# Patient Record
Sex: MALE | Race: BLACK OR AFRICAN AMERICAN | Employment: UNEMPLOYED | URBAN - METROPOLITAN AREA
[De-identification: names, ages, dates, MRNs, and addresses within clinical notes are randomized per-mention and may not be internally consistent; named-entity substitution may affect disease eponyms.]

---

## 2019-01-09 ENCOUNTER — HOSPITAL ENCOUNTER (INPATIENT)
Age: 29
LOS: 4 days | Discharge: HOME | DRG: 885 | End: 2019-01-14
Attending: EMERGENCY MEDICINE | Admitting: PSYCHIATRY & NEUROLOGY
Payer: COMMERCIAL

## 2019-01-09 DIAGNOSIS — F32.A DEPRESSION, UNSPECIFIED DEPRESSION TYPE: ICD-10-CM

## 2019-01-09 DIAGNOSIS — T39.1X2A SUICIDE ATTEMPT BY ACETAMINOPHEN OVERDOSE, INITIAL ENCOUNTER (HCC): Primary | ICD-10-CM

## 2019-01-09 LAB
ALBUMIN SERPL-MCNC: 4.4 G/DL (ref 3.4–5)
ALBUMIN/GLOB SERPL: 1.3 {RATIO} (ref 0.8–1.7)
ALP SERPL-CCNC: 47 U/L (ref 45–117)
ALT SERPL-CCNC: 50 U/L (ref 16–61)
AMPHET UR QL SCN: NEGATIVE
ANION GAP SERPL CALC-SCNC: 6 MMOL/L (ref 3–18)
APAP SERPL-MCNC: 11 UG/ML (ref 10–30)
APAP SERPL-MCNC: 3 UG/ML (ref 10–30)
AST SERPL-CCNC: 55 U/L (ref 15–37)
BARBITURATES UR QL SCN: NEGATIVE
BASOPHILS # BLD: 0 K/UL (ref 0–0.1)
BASOPHILS NFR BLD: 1 % (ref 0–2)
BENZODIAZ UR QL: NEGATIVE
BILIRUB SERPL-MCNC: 0.4 MG/DL (ref 0.2–1)
BUN SERPL-MCNC: 16 MG/DL (ref 7–18)
BUN/CREAT SERPL: 14 (ref 12–20)
CALCIUM SERPL-MCNC: 8.6 MG/DL (ref 8.5–10.1)
CANNABINOIDS UR QL SCN: NEGATIVE
CHLORIDE SERPL-SCNC: 106 MMOL/L (ref 100–108)
CO2 SERPL-SCNC: 28 MMOL/L (ref 21–32)
COCAINE UR QL SCN: NEGATIVE
CREAT SERPL-MCNC: 1.14 MG/DL (ref 0.6–1.3)
DIFFERENTIAL METHOD BLD: ABNORMAL
EOSINOPHIL # BLD: 0 K/UL (ref 0–0.4)
EOSINOPHIL NFR BLD: 0 % (ref 0–5)
ERYTHROCYTE [DISTWIDTH] IN BLOOD BY AUTOMATED COUNT: 12.5 % (ref 11.6–14.5)
ETHANOL SERPL-MCNC: <3 MG/DL (ref 0–3)
GLOBULIN SER CALC-MCNC: 3.3 G/DL (ref 2–4)
GLUCOSE SERPL-MCNC: 94 MG/DL (ref 74–99)
HCT VFR BLD AUTO: 46.5 % (ref 36–48)
HDSCOM,HDSCOM: NORMAL
HGB BLD-MCNC: 16.5 G/DL (ref 13–16)
LYMPHOCYTES # BLD: 1.5 K/UL (ref 0.9–3.6)
LYMPHOCYTES NFR BLD: 49 % (ref 21–52)
MAGNESIUM SERPL-MCNC: 2.1 MG/DL (ref 1.6–2.6)
MCH RBC QN AUTO: 29.5 PG (ref 24–34)
MCHC RBC AUTO-ENTMCNC: 35.5 G/DL (ref 31–37)
MCV RBC AUTO: 83.2 FL (ref 74–97)
METHADONE UR QL: NEGATIVE
MONOCYTES # BLD: 0.2 K/UL (ref 0.05–1.2)
MONOCYTES NFR BLD: 5 % (ref 3–10)
NEUTS SEG # BLD: 1.4 K/UL (ref 1.8–8)
NEUTS SEG NFR BLD: 45 % (ref 40–73)
OPIATES UR QL: NEGATIVE
PCP UR QL: NEGATIVE
PLATELET # BLD AUTO: 217 K/UL (ref 135–420)
PMV BLD AUTO: 9.1 FL (ref 9.2–11.8)
POTASSIUM SERPL-SCNC: 3.6 MMOL/L (ref 3.5–5.5)
PROT SERPL-MCNC: 7.7 G/DL (ref 6.4–8.2)
RBC # BLD AUTO: 5.59 M/UL (ref 4.7–5.5)
SALICYLATES SERPL-MCNC: <2.8 MG/DL (ref 2.8–20)
SODIUM SERPL-SCNC: 140 MMOL/L (ref 136–145)
WBC # BLD AUTO: 3.1 K/UL (ref 4.6–13.2)

## 2019-01-09 PROCEDURE — 80053 COMPREHEN METABOLIC PANEL: CPT

## 2019-01-09 PROCEDURE — 85025 COMPLETE CBC W/AUTO DIFF WBC: CPT

## 2019-01-09 PROCEDURE — 99285 EMERGENCY DEPT VISIT HI MDM: CPT

## 2019-01-09 PROCEDURE — 74011250636 HC RX REV CODE- 250/636: Performed by: EMERGENCY MEDICINE

## 2019-01-09 PROCEDURE — 74011000250 HC RX REV CODE- 250: Performed by: EMERGENCY MEDICINE

## 2019-01-09 PROCEDURE — 83735 ASSAY OF MAGNESIUM: CPT

## 2019-01-09 PROCEDURE — 93005 ELECTROCARDIOGRAM TRACING: CPT

## 2019-01-09 PROCEDURE — 96361 HYDRATE IV INFUSION ADD-ON: CPT

## 2019-01-09 PROCEDURE — 96374 THER/PROPH/DIAG INJ IV PUSH: CPT

## 2019-01-09 PROCEDURE — 80307 DRUG TEST PRSMV CHEM ANLYZR: CPT

## 2019-01-09 RX ORDER — ONDANSETRON 2 MG/ML
4 INJECTION INTRAMUSCULAR; INTRAVENOUS
Status: COMPLETED | OUTPATIENT
Start: 2019-01-09 | End: 2019-01-09

## 2019-01-09 RX ADMIN — SODIUM CHLORIDE 1000 ML: 900 INJECTION, SOLUTION INTRAVENOUS at 17:05

## 2019-01-09 RX ADMIN — POISON ADSORBENT 50 G: 50 SUSPENSION ORAL at 16:41

## 2019-01-09 RX ADMIN — ONDANSETRON 4 MG: 2 INJECTION INTRAMUSCULAR; INTRAVENOUS at 17:03

## 2019-01-09 NOTE — ED PROVIDER NOTES
EMERGENCY DEPARTMENT HISTORY AND PHYSICAL EXAM 
 
4:35 PM 
 
 
Date: 1/9/2019 Patient Name: Chiki Clifton History of Presenting Illness Chief Complaint Patient presents with  Drug Overdose  Mental Health Problem History Provided By: Patient Chief Complaint: Drug overdose Duration: 15 Minutes Timing:  Acute Location: NA 
Quality: 5 500mg Tylenol Severity: Moderate Modifying Factors: NA Associated Symptoms: suicidal ideations Additional History (Context): Chiki Clifton is a 34 y.o. male with no PMHx who presents with c/o moderate acute onset drug overdose of 5 500 mg Tylenol that occurred 15 minutes ago with associated Sx of suicidal ideations. He states he took all of the pills at once with water and he was at home when he did this. Pt states he has been under a lot of stress recently. Pt denies ETOH or drug use. Denies any further complaints or symptoms at the moment. PCP: None Current Outpatient Medications Medication Sig Dispense Refill  tolterodine ER (DETROL LA) 4 mg ER capsule Take 1 Cap by mouth daily. 90 Cap 3 Past History Past Medical History: 
History reviewed. No pertinent past medical history. Past Surgical History: 
History reviewed. No pertinent surgical history. Family History: 
History reviewed. No pertinent family history. Social History: 
Social History Tobacco Use  Smoking status: Never Smoker  Smokeless tobacco: Never Used Substance Use Topics  Alcohol use: Yes  Drug use: Not on file Allergies: Allergies Allergen Reactions  Penicillins Other (comments) Pt cant remember what reaction was to PCN . Review of Systems Review of Systems Constitutional: Negative for activity change, fatigue and fever. HENT: Negative for congestion and rhinorrhea. Eyes: Negative for visual disturbance. Respiratory: Negative for shortness of breath. Cardiovascular: Negative for chest pain and palpitations. Gastrointestinal: Negative for abdominal pain, diarrhea, nausea and vomiting. Genitourinary: Negative for dysuria and hematuria. Musculoskeletal: Negative for back pain. Skin: Negative for rash. Neurological: Negative for dizziness, weakness and light-headedness. Psychiatric/Behavioral: Positive for suicidal ideas. Positive for drug overdose All other systems reviewed and are negative. Physical Exam  
 
Visit Vitals /81 Pulse (!) 104 Temp 98.2 °F (36.8 °C) Resp 15 SpO2 99% Physical Exam  
Constitutional: Vital signs are normal. He appears well-developed and well-nourished. He is active. Non-toxic appearance. He does not appear ill. No distress. HENT:  
Head: Normocephalic and atraumatic. Neck: Normal range of motion. Neck supple. Carotid bruit is not present. No tracheal deviation present. No thyromegaly present. Cardiovascular: Regular rhythm and normal heart sounds. Exam reveals no gallop and no friction rub. No murmur heard. Tachycardiac rate Pulmonary/Chest: Effort normal and breath sounds normal. No stridor. No respiratory distress. He has no wheezes. He has no rales. He exhibits no tenderness. Abdominal: Soft. He exhibits no distension and no mass. There is no tenderness. There is no rebound, no guarding and no CVA tenderness. Musculoskeletal: Normal range of motion. Neurological: He is alert. Skin: Skin is warm, dry and intact. He is not diaphoretic. No pallor. Psychiatric: He has a normal mood and affect. His speech is normal and behavior is normal. Judgment and thought content normal.  
Nursing note and vitals reviewed. Diagnostic Study Results Labs - Recent Results (from the past 12 hour(s)) DRUG SCREEN, URINE Collection Time: 01/09/19  4:34 PM  
Result Value Ref Range  BENZODIAZEPINES NEGATIVE  NEG    
 BARBITURATES NEGATIVE  NEG    
 THC (TH-CANNABINOL) NEGATIVE  NEG    
 OPIATES NEGATIVE  NEG    
 PCP(PHENCYCLIDINE) NEGATIVE  NEG    
 COCAINE NEGATIVE  NEG    
 AMPHETAMINES NEGATIVE  NEG METHADONE NEGATIVE  NEG HDSCOM (NOTE) CBC WITH AUTOMATED DIFF Collection Time: 01/09/19  4:55 PM  
Result Value Ref Range WBC 3.1 (L) 4.6 - 13.2 K/uL  
 RBC 5.59 (H) 4.70 - 5.50 M/uL  
 HGB 16.5 (H) 13.0 - 16.0 g/dL HCT 46.5 36.0 - 48.0 % MCV 83.2 74.0 - 97.0 FL  
 MCH 29.5 24.0 - 34.0 PG  
 MCHC 35.5 31.0 - 37.0 g/dL  
 RDW 12.5 11.6 - 14.5 % PLATELET 518 507 - 095 K/uL MPV 9.1 (L) 9.2 - 11.8 FL  
 NEUTROPHILS 45 40 - 73 % LYMPHOCYTES 49 21 - 52 % MONOCYTES 5 3 - 10 % EOSINOPHILS 0 0 - 5 % BASOPHILS 1 0 - 2 %  
 ABS. NEUTROPHILS 1.4 (L) 1.8 - 8.0 K/UL  
 ABS. LYMPHOCYTES 1.5 0.9 - 3.6 K/UL  
 ABS. MONOCYTES 0.2 0.05 - 1.2 K/UL  
 ABS. EOSINOPHILS 0.0 0.0 - 0.4 K/UL  
 ABS. BASOPHILS 0.0 0.0 - 0.1 K/UL  
 DF AUTOMATED METABOLIC PANEL, COMPREHENSIVE Collection Time: 01/09/19  4:55 PM  
Result Value Ref Range Sodium 140 136 - 145 mmol/L Potassium 3.6 3.5 - 5.5 mmol/L Chloride 106 100 - 108 mmol/L  
 CO2 28 21 - 32 mmol/L Anion gap 6 3.0 - 18 mmol/L Glucose 94 74 - 99 mg/dL BUN 16 7.0 - 18 MG/DL Creatinine 1.14 0.6 - 1.3 MG/DL  
 BUN/Creatinine ratio 14 12 - 20 GFR est AA >60 >60 ml/min/1.73m2 GFR est non-AA >60 >60 ml/min/1.73m2 Calcium 8.6 8.5 - 10.1 MG/DL Bilirubin, total 0.4 0.2 - 1.0 MG/DL  
 ALT (SGPT) 50 16 - 61 U/L  
 AST (SGOT) 55 (H) 15 - 37 U/L Alk. phosphatase 47 45 - 117 U/L Protein, total 7.7 6.4 - 8.2 g/dL Albumin 4.4 3.4 - 5.0 g/dL Globulin 3.3 2.0 - 4.0 g/dL A-G Ratio 1.3 0.8 - 1.7 SALICYLATE Collection Time: 01/09/19  4:55 PM  
Result Value Ref Range Salicylate level <0.8 (L) 2.8 - 20.0 MG/DL  
ETHYL ALCOHOL Collection Time: 01/09/19  4:55 PM  
Result Value Ref Range  ALCOHOL(ETHYL),SERUM <3 0 - 3 MG/DL  
ACETAMINOPHEN  
 Collection Time: 01/09/19  4:55 PM  
Result Value Ref Range Acetaminophen level 11 10.0 - 30.0 ug/mL EKG, 12 LEAD, INITIAL Collection Time: 01/09/19  5:06 PM  
Result Value Ref Range Ventricular Rate 111 BPM  
 Atrial Rate 111 BPM  
 P-R Interval 138 ms QRS Duration 86 ms  
 Q-T Interval 378 ms QTC Calculation (Bezet) 514 ms Calculated P Axis 48 degrees Calculated R Axis 7 degrees Calculated T Axis 59 degrees Diagnosis Sinus tachycardia Otherwise normal ECG No previous ECGs available EKG, 12 LEAD, SUBSEQUENT Collection Time: 01/09/19  8:46 PM  
Result Value Ref Range Ventricular Rate 112 BPM  
 Atrial Rate 112 BPM  
 P-R Interval 132 ms QRS Duration 82 ms Q-T Interval 322 ms QTC Calculation (Bezet) 439 ms Calculated P Axis 49 degrees Calculated R Axis -4 degrees Calculated T Axis 37 degrees Diagnosis Sinus tachycardia Otherwise normal ECG When compared with ECG of 09-JAN-2019 17:06, No significant change was found ACETAMINOPHEN Collection Time: 01/09/19  8:50 PM  
Result Value Ref Range Acetaminophen level 3 (L) 10.0 - 30.0 ug/mL MAGNESIUM Collection Time: 01/09/19  8:50 PM  
Result Value Ref Range Magnesium 2.1 1.6 - 2.6 mg/dL Radiologic Studies - No orders to display Medical Decision Making I am the first provider for this patient. I reviewed the vital signs, available nursing notes, past medical history, past surgical history, family history and social history. Vital Signs-Reviewed the patient's vital signs. Pulse Oximetry Analysis -  100% on room air (Interpretation) normal  
 
Records Reviewed: Nursing Notes and Old Medical Records (Time of Review: 4:35 PM) ED Course: Progress Notes, Reevaluation, and Consults: 
Spoke with poison control upon pt's arrival.  Recommendation for activated charcoal. 
 
Provider Notes (Medical Decision Making): get labs, EKG, give charcoal. Repeat tylenol and once medically cleared, consult crisis. 5:55 PM : Pt care transferred to [facility] provider. History of patient complaint(s), available diagnostic reports and current treatment plan has been discussed thoroughly. Bedside rounding on patient occured : no . Intended disposition of patient : admit Pending diagnostics reports and/or labs (please list): medical clearance, crisis evaluation Diagnosis Clinical Impression: 1. Suicide attempt by acetaminophen overdose, initial encounter ([facility] [address] 2. Depression, unspecified depression type Disposition: TBD Follow-up Information None Medication List  
  
ASK your doctor about these medications   
tolterodine ER 4 mg ER capsule Commonly known as:  Marcellustyson Boss Take 1 Cap by mouth daily. _______________________________ Scribe Attestation Rajwinder Cuevas acting as a scribe for and in the presence of Dante Jennings January 09, 2019 at 4:35 PM 
    
Provider Attestation:     
I personally performed the services described in the documentation, reviewed the documentation, as recorded by the scribe in my presence, and it accurately and completely records my words and actions. January 09, 2019 at 4:35 PM - Lane Jeffries PA-C 
 
 
_______________________________ Progress note: 
 
 
4 hour tylenol level is 3,  Pt is medically cleared for mh evaluation. [address] Cover with crisis informed of clearance for evaluation 0400 pt still awaiting crisis disposition, pt is resting quietly and stable at this time 4:22 AM : Pt care transferred to Dr. Meme Lozoya  ,ED provider. History of patient complaint(s), available diagnostic reports and current treatment plan has been discussed thoroughly. Bedside rounding on patient occured : no . Intended disposition of patient : TBD Pending diagnostics reports and/or labs (please list):  Crisis eval 
 
 
 
 
Rhae Du ENP-C, FNP-C

## 2019-01-09 NOTE — ED TRIAGE NOTES
Pt states took at total of five 500 mg tylenol at approx 1615. Pt admits thoughts of SI and taking medication states under a lot of stress.

## 2019-01-10 PROBLEM — R45.89 SUICIDAL BEHAVIOR: Status: ACTIVE | Noted: 2019-01-10

## 2019-01-10 PROBLEM — F32.A DEPRESSION: Status: ACTIVE | Noted: 2019-01-10

## 2019-01-10 PROBLEM — F32.2 MAJOR DEPRESSIVE DISORDER, SINGLE EPISODE, SEVERE WITHOUT PSYCHOTIC FEATURES (HCC): Status: ACTIVE | Noted: 2019-01-10

## 2019-01-10 LAB
ATRIAL RATE: 111 BPM
ATRIAL RATE: 112 BPM
CALCULATED P AXIS, ECG09: 48 DEGREES
CALCULATED P AXIS, ECG09: 49 DEGREES
CALCULATED R AXIS, ECG10: -4 DEGREES
CALCULATED R AXIS, ECG10: 7 DEGREES
CALCULATED T AXIS, ECG11: 37 DEGREES
CALCULATED T AXIS, ECG11: 59 DEGREES
DIAGNOSIS, 93000: NORMAL
DIAGNOSIS, 93000: NORMAL
P-R INTERVAL, ECG05: 132 MS
P-R INTERVAL, ECG05: 138 MS
Q-T INTERVAL, ECG07: 322 MS
Q-T INTERVAL, ECG07: 378 MS
QRS DURATION, ECG06: 82 MS
QRS DURATION, ECG06: 86 MS
QTC CALCULATION (BEZET), ECG08: 439 MS
QTC CALCULATION (BEZET), ECG08: 514 MS
VENTRICULAR RATE, ECG03: 111 BPM
VENTRICULAR RATE, ECG03: 112 BPM

## 2019-01-10 PROCEDURE — 74011250637 HC RX REV CODE- 250/637: Performed by: PSYCHIATRY & NEUROLOGY

## 2019-01-10 PROCEDURE — 65220000003 HC RM SEMIPRIVATE PSYCH

## 2019-01-10 RX ORDER — TRAZODONE HYDROCHLORIDE 50 MG/1
50 TABLET ORAL
Status: DISCONTINUED | OUTPATIENT
Start: 2019-01-10 | End: 2019-01-14 | Stop reason: HOSPADM

## 2019-01-10 RX ORDER — HYDROXYZINE PAMOATE 50 MG/1
50 CAPSULE ORAL
Status: DISCONTINUED | OUTPATIENT
Start: 2019-01-10 | End: 2019-01-10

## 2019-01-10 RX ORDER — HYDROXYZINE PAMOATE 50 MG/1
50 CAPSULE ORAL
Status: DISCONTINUED | OUTPATIENT
Start: 2019-01-10 | End: 2019-01-14 | Stop reason: HOSPADM

## 2019-01-10 RX ADMIN — TRAZODONE HYDROCHLORIDE 50 MG: 50 TABLET ORAL at 20:45
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
## 2019-01-10 NOTE — BSMART NOTE
SOCIAL WORK GROUP THERAPY PROGRESS NOTE Group Time:  11am 
 
Group Topic:  Coping Skills Group Participation: Pt was not disturbed due to staff discretion

## 2019-01-10 NOTE — ED NOTES
TRANSFER - OUT REPORT: 
 
Verbal report given to Donovan Vasquez RN(name) on Vasquez Pierce  being transferred to adult unit, behavioral health(unit) for routine progression of care Report consisted of patients Situation, Background, Assessment and  
Recommendations(SBAR). Information from the following report(s) SBAR was reviewed with the receiving nurse. Lines:  
Peripheral IV 01/09/19 Left Antecubital (Active) Site Assessment Clean, dry, & intact 1/9/2019  4:55 PM  
Phlebitis Assessment 0 1/9/2019  4:55 PM  
Infiltration Assessment 0 1/9/2019  4:55 PM  
Dressing Status Clean, dry, & intact 1/9/2019  4:55 PM  
Dressing Type Tape;Transparent 1/9/2019  4:55 PM  
Hub Color/Line Status Pink;Patent; Flushed 1/9/2019  4:55 PM  
Action Taken Blood drawn 1/9/2019  4:55 PM  
  
 
Opportunity for questions and clarification was provided. Patient transported with: 
 Client Outlook

## 2019-01-10 NOTE — H&P
[facility] ADMIT NOTE Sidney Chirinos 
MR#: [id number] : 1990 ACCOUNT #: [de-identified] ADMIT DATE: 2019 IDENTIFYING INFORMATION:  The patient is a 82-year-old [place] American male, single, admitted to this facility on a voluntary basis on the above-mentioned date. BASIS FOR ADMISSION:  The patient brought himself to the attention of [facility] Emergency Department with a history then obtained indicative of his taking an overdose of acetaminophen a total of five 500 mg tablets that occurred 15 minutes prior to his coming to the ED. Treated with charcoal and considered to be medically cleared. After a while the patient was evaluated by one of our crisis workers at which time it became obvious that he required further inpatient psychiatric care. The patient's case was presented before the psychiatrist on call who kindly admitted the patient to my service for this admission. PSYCHIATRIC HISTORY:  This is the first time in which the patient is psychiatrically treated. The main issue appears to be a stress associated to his job, the fact that \"he never knew when he was going to have to leave town,\" in addition to having some stress related discussions with his aunt and uncle with whom he resides regarding what he wants to do in the future. 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 That is the reason for which he has been leaving town for up to a week or 10 days, coming back home for 1 or 2 days and then he leaves town again. This has not helped with his personal life, he says, but in general did not help either with his being able to determine what he wants to do with his life.   The patient was able to put enough money away for him to not to have to work up to a year he says and so when he discussed about this with his aunt and suggested that he would like to do some type study that will provide him with a job in the medical field. It appears that the type of job that he told his aunt that he wanted to look into was not going to [place] able to provide him with a decent income. \"  So upset about his family's reaction, the patient impulsively, he says, took the above-mentioned overdose. After being medically cleared inpatient hospitalization was then indicated and so this is the basis for this admission. PAST MEDICAL HISTORY:  Patient has what appears to be negative medical history. He is a young man with a negative history of smoking, drug use, negative surgical history also. REVIEW OF SYSTEMS:   
CONSTITUTIONAL:  (Obtained from the workup in the emergency room) HEENT:  Negative. EYES:  Negative. RESPIRATORY:  Negative. CARDIOVASCULAR:  Negative. GASTROINTESTINAL:  Negative. GENITOURINARY:  Negative. MUSCULOSKELETAL:  Negative. NEUROLOGICAL:  Negative. PSYCHIATRIC:  Please see above-mentioned history. PHYSICAL EXAMINATION: 
VITAL SIGNS:  Blood pressure in the emergency room 146/81 with a heart rate of 104. Since admission to the facility, blood pressure today 149/86 with a heart rate of 85. Respirations 16. Temperature 99 degrees Fahrenheit. When he was being evaluated in the emergency room, it was noted that his blood pressure was elevated to 186/106 with a heart rate of 118. It is not clear as to that being a mistake or not. However, it appears that it is the former. The patient has never been treated for hypertension before and indicated that the blood pressure is not \"his usual.\" 
CONSTITUTIONAL:  The patient is showing no evidence of alcohol or any other type of drugs, other signs of intoxication or withdrawal symptoms. He is described as well-developed, well-nourished, nontoxic appearance. HEENT:  Normocephalic and atraumatic. NECK:  Normal range of motion. Neck supple.   Carotid bruit is not present. There is no tracheal deviation. There is no evidence or thyromegaly. CARDIOVASCULAR:  Regular rhythm, normal heart sounds. There is no gallop or friction rub. There is no evidence of murmurs. The patient's heart rate was described as high. PULMONARY:  Effort normal.  Breath sounds are normal.  There is no respiratory distress. There is no wheezing, no tenderness. ABDOMEN:  Soft. There is no evidence of distention, no rebound. There are no masses. There is no visceromegaly. MUSCULOSKELETAL:  Normal range of motion. NEUROLOGIC:  Alert. Cranial nerves are normal.  Reflexes are 2+ equally bilaterally. PSYCHIATRIC:  Please see mental status exam. 
 
LABORATORY DATA:  Multiple lab tests were performed at the time of the patient's evaluation in the emergency room including a CBC with differential that showed the presence of mild leukopenia with a white blood cell count of 3.1; however, elevated red blood cell to 5.59, hemoglobin 16 was noted. Hematocrit is normal at 46.5. Differential is normal.  Platelet count normal at 217. Blood chemistry showed normal electrolytes:  Sodium 140, potassium 3.6, chloride 106, blood sugar 94, BUN of 16, creatinine 1.14. Estimated GFR above 60 mL per minute. Liver function test is normal with the exception of mild elevation of AST to 55, normal values between 15 and 37. Acetaminophen levels 11 when he came in, several hours later (5 hours later) 3. Salicylate levels below therapeutic range. Urine drug screen was negative. Electrocardiogram showed presence of a sinus tachycardia with a ventricular rate response of 112 per minute,  and QTC of 439 noted. SOCIAL HISTORY:  The patient denies the use of alcohol or illicit substances and no use of over-the-counter medications. PERSONAL AND FAMILY HISTORY:  The patient has 2 older brothers. He is the third of 3, having.   He indicated that after he was born, his mother took him to his uncle and aunt's home to stay there and that is the place where he grew up entirely. It appeared that \"his mother did not have enough income to maintain the 3 children,\" and so that being the reason for which he basically was under the custody of his mother's family. The patient is a high school grad. 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 His job was enjoyable  initially, he said since he traveled all over the place. However, later on became a very tiresome type of job, especially because of never being able to be at home for more than a couple of days. Also the number of hours that he worked were very, very high. The patient is not involved in any relationships. He basically described the relationship with his uncle and aunt as family members who are supportive of him. However, exception to the rule is their reaction to what he said that he would like to go in the near future. It appears that the conversation did not go very well when he mentioned to them that he had resigned from his job. MENTAL STATUS EXAM:  -American male who looks his stated age. During this evaluation, the patient showed no evidence of alcohol or any other type of drug related signs, intoxication or withdrawal symptoms. The patient is coherent, shows quality of continuity of associations. There is no evidence of flight of ideas, pressure of his speech, ideas of reference or influence or any hallucinatory process. The patient is depressed. He described helplessness and hopelessness. However, he has been found able, willing and capable to talk to the staff. He has been able to control any thoughts of self-harm. There is no evidence, whatsoever of his being potentially dangerous to others. Cognition is intact. Intellectual capacity is average. The patient is considered to be psychiatrically competent. ASSETS:  Alert, intelligent, voluntary admission. WEAKNESSES:  Family stressors, job-related stressors. CLINICAL IMPRESSION:   
AXIS I:  Major depressive disorder, single, without psychosis, severe. AXIS II:  Noncontributory. AXIS III:  Status post acetaminophen overdose without evidence of sequela. PLAN: 
1. The patient was admitted to the adult CD program, will be seen daily and will be referred to the groups within context of the program. 
2.  Place on close watch for suicide. The patient is willing, able and capable to talk to staff. He has been able to control any thoughts of self-harm. We will obviously observe closely regarding this. 3.  The patient was advised about potential for prescriptions for antidepressants based upon his history. I suggested for him to consider the possibility of treatment with citalopram.  I will be asking the staff to give him a read out about Celexa and when I see him tomorrow, we will make a decision about prescribing or not, depending upon what he tells me. For now a prescription for Vistaril on a p.r.n. basis has been ordered. Estimated length of stay otherwise, he is 3-5 days. Prognosis will depend upon treatment response and treatment compliance. MD DARIO Garsia/ 
D: 01/10/2019 11:32    
T: 01/10/2019 12:31 
JOB #: [id number]
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
## 2019-01-10 NOTE — ED NOTES
Received nursing report from Sherwin angulo [employer]. Patient connected to cardiac monitor, tolerating food. Patient denies any complaints at this time. Vital signs are stable. Awaiting repeat lab results. Will continue to closely monitor patient. No acute distress noted.

## 2019-01-10 NOTE — ED NOTES
Spoke with [place] from poison control, updated on lab results and pt's condition. She recommended magnesium level and subsequent EKG. Orders placed, will continue to monitor.

## 2019-01-10 NOTE — PROGRESS NOTES
Patient has been in his room most of the day. He did come out for meals. Information provide on Celexa. Voiced no further complaints.

## 2019-01-10 NOTE — ED NOTES
Bedside shift change report given to [facility]. by Belem Chopra RN. Report included the following information ED Summary.

## 2019-01-10 NOTE — BH NOTES
Amando Weston is  participating in Treatment Concerns Group time: 1700 Personal goal for participation: [employer] Goal orientation: community Group therapy participation: fully participated Therapeutic interventions reviewed and discussed: Staff discussed  Staff discussed the Mental Health programs offered. Unit schedule for groups,  Visiting hours, patient advocate name and phone number and where this information is posted on the unit, etc. Report any maintenance/housekeeping or treatment concerns to staff so it can be addressed by the Treatment Team. 
 
Impression of participation: Pt.did not have any maintenance/housekeeping or treatment concerns to report to staff .

## 2019-01-10 NOTE — ED NOTES
Poison control updated on patient's care. Patient is medically cleared, awaiting evaluation from crisis nurse.

## 2019-01-10 NOTE — BH NOTES
Admission Note : SBAR received from Jose Antonio Pierce RN. Pt arrived via wheelchair accompanied by staff and security. 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 He states he put money aside to cover him for 1 year. He lives at home with his mother , aunt , and uncle. He is vague regarding his personal situational stressors but states he overdosed when feeling overwhelmed. He denies present suicidal ideation. He denies drug or alcohol abuse. He states he does not have a PCP.

## 2019-01-10 NOTE — BSMART NOTE
OCCUPATIONAL THERAPY PROGRESS NOTE Group Time:  1500 Attendance: The patient attended full group. Participation: The patient participated with minimal elaboration in the activity. Attention: The patient was able to focus on the activity. Interaction: The patient acknowledges others or responds to questions,  with no spontaneous interaction. Sat quietly most of group and responded minimally to direct questions only.

## 2019-01-11 PROCEDURE — 65220000003 HC RM SEMIPRIVATE PSYCH

## 2019-01-11 PROCEDURE — 74011250637 HC RX REV CODE- 250/637: Performed by: PSYCHIATRY & NEUROLOGY

## 2019-01-11 RX ORDER — CITALOPRAM 20 MG/1
20 TABLET, FILM COATED ORAL
Status: DISCONTINUED | OUTPATIENT
Start: 2019-01-11 | End: 2019-01-14 | Stop reason: HOSPADM

## 2019-01-11 RX ADMIN — CITALOPRAM HYDROBROMIDE 20 MG: 20 TABLET ORAL at 12:00

## 2019-01-11 RX ADMIN — TRAZODONE HYDROCHLORIDE 50 MG: 50 TABLET ORAL at 20:19

## 2019-01-11 NOTE — BH NOTES
Chiki Clifton is participating in Leisure Activity Group. Group time: 3803 Personal goal for participation:  Decrease Anxiety Goal orientation: social 
 
Group therapy participation: active Therapeutic interventions reviewed and discussed:  Staff encouraged to  choos relaxation activities to decrease anxiety while interacting with peers Impression of participation:  Pt. chose to , play games with peers, color dylan patterns or watch a movie.

## 2019-01-11 NOTE — BSMART NOTE
OCCUPATIONAL THERAPY PROGRESS NOTE Group Time:  0895 Attendance: The patient attended full group. Participation: The patient participated with moderate elaboration in the activity. Attention: The patient was able to focus on the activity. Interaction: The patient occasionally  interacts with others. Participated in identification of positive self talk and affirmation. Mood seemed slightly brighter, interacting more with peers.

## 2019-01-11 NOTE — BH NOTES
GROUP THERAPY PROGRESS NOTE Lila Blanco is participating in [place]. Group time: 30 minutes Personal goal for participation: verify insight and reality orientation; discuss guideline compliance, unit issues and community announcements Goal orientation: personal 
 
Group therapy participation: minimal

## 2019-01-11 NOTE — BSMART NOTE
SW made contact with patient as he engaged in art activity within the milieu. Patient is attentive and focused on purpose of interview. Patient reports to be feeling much better however, understands he may have been suffering with depression. It is reported that patient has attended groups and has been compliant with rules of the milieu. Patient denies SI/HI. Patient denies AVH. SW will continue to monitor patient during hospitalization consulting with treating psychiatrist to complete disposition. Jayson Carreno MSW, LCSW-E

## 2019-01-11 NOTE — BH NOTES
GROUP THERAPY PROGRESS NOTE Prema Morales is participating in Coping Skills Group. Group time: 30 minutes Goal orientation: personal 
 
Group therapy participation: active Therapeutic interventions reviewed and discussed: We reviewed worksheet entitled Types of Coping Skills. We discussed Self-soothing through the five senses, Distraction, Opposite Action; Emotional Awareness, Mindfulness and the importance in having a Crisis Plan. Impression of participation:   Corin Tang was an active participant in the group discussion.

## 2019-01-11 NOTE — BH NOTES
Pt participated in [facility] and [address]. Pt received a visit from his aunt and uncle, visit went well. Pt  appears calm and cooperative in the milieu interacting with staff and peers. Pt. denies SI,HI and AVH today. Pt ate 100% of dinner and snack. Pt contracts for safety on the unit. Pt.denies any new medical/pain complaints. Staff encouraged Pt. to participate in treatment plan. Pt agreed. Pt. remain free of falls and provided non skid socks. Staff will continue to monitor Pt. for behavior safety and location.

## 2019-01-11 NOTE — BH NOTES
GROUP THERAPY PROGRESS NOTE Prema Morales is participating in Recreational Therapy. Group time: 45 minutes Goal orientation: relaxation Group therapy participation: active Therapeutic interventions reviewed and discussed: Patients spent time in recreation area and outside courtyard. Impression of participation:   Corin Tang seemed to really enjoy an opportunity for some fresh air and time off the unit with peers. He played foozball with a peer.

## 2019-01-11 NOTE — PROGRESS NOTES
Problem: Suicide/Homicide (Adult/Pediatric) Goal: *STG: Remains safe in hospital 
Pt will not harm self during this hospital stay. He will be assessed daily for suicidal ideation. Outcome: Progressing Towards Goal 
Remains safe. Goal: *STG: Attends activities and groups Pt will attend 3 or more groups daily while hospialized. Outcome: Progressing Towards Goal 
Attending groups. Goal: *STG/LTG: Complies with medication therapy Pt will take all medication as prescribed daily while hospitalized. Outcome: Progressing Towards Goal 
Medication compliant. Comments: Patient denies having suicidal thoughts. He states he had a bad argument with his aunt and wasn't thinking right. He states he overrracted and took the medication without thinking. Since then he has made up with his aunt. Discuss the medication Celexa, education provided, verbalized understanding.

## 2019-01-11 NOTE — PROGRESS NOTES
The pt was seen individually and his case was discussed with staff. Fiona Eisenberg agreed to his being prescribed with antidepressant tx. Written info about citalopram was provided upon the request of the undersigned, by NS. Thus, with his consent, tx with Celexa 20 mg, was started today. If tolerance is appropriate, and the pt continues to show evidence of improvement, a discharge on Monday or Tuesday is expected. Labs were reviewed again. An elevated AST to 55 noticed. This may be an isolated finding. No evidence by hx, or by PE, of a fatty liver. A hep C antibody test was requested. It will be drawn tomorrow. There is no hx by the way, of drug use, specifically IV drug use, nor a hx of medications being prescribed that could explain the test results. The pt did take an OD with acetaminophen, and even though was a small dose, this perhaps could explain the test results. We will request in addition, a repeat of his LFTs. See orders. VS show a BP of 132/86 with a HR of 79. Depression is described as less intense. He also described during session today, the visit he had from his uncle and aunt last night, going well. So tx plan will remain the same.

## 2019-01-11 NOTE — BSMART NOTE
SOCIAL WORK GROUP THERAPY PROGRESS NOTE Group Time:  10:15am   1:15pm 
 
Group Topic:  Coping Skills    C D Issues Group Participation:   
 
Pt moderately involved during group discussion but remained attentive. Emphasis of session was presentation of basic \"CBT\" principlesas well as list of typical cognitive distortions. He identified 6 of 10 needing to be addressed. \"Seven Steps\" for taking responsibility for our Happiness was reviewed including commitment to change, self-care, setting limits, goal setting & letting go.

## 2019-01-12 PROCEDURE — 65220000003 HC RM SEMIPRIVATE PSYCH

## 2019-01-12 PROCEDURE — 74011250637 HC RX REV CODE- 250/637: Performed by: PSYCHIATRY & NEUROLOGY

## 2019-01-12 RX ADMIN — CITALOPRAM HYDROBROMIDE 20 MG: 20 TABLET ORAL at 08:37

## 2019-01-12 RX ADMIN — HYDROXYZINE PAMOATE 50 MG: 50 CAPSULE ORAL at 20:57

## 2019-01-12 NOTE — BH NOTES
Patient cooperative, pleasant but very quiet. Patient denies thoughts to harm self or others and contracted for safety. Patient remained in the day room for most part of the shift but isolated to self, he ate 100% dinner, grand parents visited and visitation went well. Patient participated in groups, he was encouraged to complete his hygiene, received medications for sleep and remained free of falls.

## 2019-01-12 NOTE — PROGRESS NOTES
The pt was seen individually and his case was discussed with staff. Trip Lion continues to show evidence of improvement. He is tolerating his prescription for citalopram well. Described in addition, a good visit with his family. So will continue the same. Discharge early next week expected. VS are stable. /77 and HR of 74 earlier today. Will see him again in the morning.

## 2019-01-12 NOTE — PROGRESS NOTES
Problem: Suicide/Homicide (Adult/Pediatric) Goal: *STG: Attends activities and groups Pt will attend 3 or more groups daily while hospialized. Outcome: Progressing Towards Goal 
Pt will attend at least 3 groups daily during this admission. Goal: *STG/LTG: Complies with medication therapy Pt will take all medication as prescribed daily while hospitalized. Outcome: Progressing Towards Goal 
Pt will comply with daily medication regimen during this admission. Problem: Falls - Risk of 
Goal: *Absence of Falls Document [facility] Officer Fall Risk and appropriate interventions in the flowsheet. Outcome: Progressing Towards Goal 
Fall Risk Interventions: 
Pt will remain free of falls daily during this admission. Medication Interventions: Assess postural VS orthostatic hypotension Comments: Pt in milieu the majority of the shift interacting with peers and staff. Pt denies any SI/HI and AVH. Pt compliant with groups and meals. Will continue to monitor for safety throughout the shift.

## 2019-01-12 NOTE — BH NOTES
Pt low-key, in milieu much of shift; mood sad (decreasing), affect reticent; cooperative, compliant, insight into illness and interaction moderate, goal oriented to (per pt) \"try to fix my life, do something that's not boring\", visitation with grandmother good, no behavioral issues during shift. Denies SI, HI and AVH; involved in no falls this shift - skid resistant footwear utilized and floor kept free of items that could precipitate a fall.

## 2019-01-13 LAB
ALBUMIN SERPL-MCNC: 4 G/DL (ref 3.4–5)
ALBUMIN/GLOB SERPL: 1.4 {RATIO} (ref 0.8–1.7)
ALP SERPL-CCNC: 49 U/L (ref 45–117)
ALT SERPL-CCNC: 53 U/L (ref 16–61)
AST SERPL-CCNC: 47 U/L (ref 15–37)
BILIRUB DIRECT SERPL-MCNC: 0.2 MG/DL (ref 0–0.2)
BILIRUB SERPL-MCNC: 0.8 MG/DL (ref 0.2–1)
GLOBULIN SER CALC-MCNC: 2.8 G/DL (ref 2–4)
PROT SERPL-MCNC: 6.8 G/DL (ref 6.4–8.2)

## 2019-01-13 PROCEDURE — 36415 COLL VENOUS BLD VENIPUNCTURE: CPT

## 2019-01-13 PROCEDURE — 74011250637 HC RX REV CODE- 250/637: Performed by: PSYCHIATRY & NEUROLOGY

## 2019-01-13 PROCEDURE — 65220000003 HC RM SEMIPRIVATE PSYCH

## 2019-01-13 PROCEDURE — 86803 HEPATITIS C AB TEST: CPT

## 2019-01-13 PROCEDURE — 80076 HEPATIC FUNCTION PANEL: CPT

## 2019-01-13 RX ADMIN — CITALOPRAM HYDROBROMIDE 20 MG: 20 TABLET ORAL at 08:29

## 2019-01-13 NOTE — BH NOTES
GROUP THERAPY PROGRESS NOTE Zaira Waltonrezamima is participating in [place]. Group time: 30 minutes Personal goal for participation: discuss guideline compliance, unit issues and community announcements Goal orientation: community Group therapy participation: minimal

## 2019-01-13 NOTE — BH NOTES
GROUP THERAPY PROGRESS NOTE Parisa Martinez is participating in [place]. Group time: 30 minutes Personal goal for participation: discuss daily Tx goal(s); discuss guideline compliance, unit issues and community announcements Goal orientation: personal 
 
Group therapy participation: minimal

## 2019-01-13 NOTE — PROGRESS NOTES
The pt was seen individually and his case was discussed with staff. Chart was reviewed,  
Lee Alfaro continues to describe his depressive symptoms getting better. Tolerance to citalopram is excellent, and his visits with his uncle and aunt are proceeding without difficulties. Hence, a discharge tomorrow is expected. Otherwise, a repeat of his LFTs continue to show elevated AST results. This specific enzyme gets high in response to medications. Since it was already higher before his beginning tx with Celexa, the antidepressant is not the culprit. Several possibilities for the abnormality include his OD with acetaminophen, the same as a fatty liver. For completeness, a hep C AB test was drawn. Results are pending. Otherwise we are hopeful to discharge tomorrow. OP referral will follow. In addition to above, we noticed his BP being mildly elevated. Will continue to observe closely. The pt has been advised that after discharge, he should check on his BP regularly. If it remains high, he should consult with his PCP. Will see again tomorrow.

## 2019-01-13 NOTE — BH NOTES
GROUP THERAPY PROGRESS NOTE Jonatan Khan is not participating in Relaxation Group. Staff encouraged patient to participate but patient remained sleeping in his room.

## 2019-01-13 NOTE — BH NOTES
Pt presents euthymic. Pt denies si/hi and avh. Pt has been been medication and meal complaint. Pt has been watching television for most of the shift (football). Pt has been cooperative and pleasant. Pt has been free from falls. Will continue to monitor pt for safety.

## 2019-01-13 NOTE — PROGRESS NOTES
Problem: Suicide/Homicide (Adult/Pediatric) Goal: *STG: Remains safe in hospital 
Pt will not harm self during this hospital stay. He will be assessed daily for suicidal ideation. Outcome: Progressing Towards Goal 
aeb pt free from injury this shift Goal: *STG/LTG: Complies with medication therapy Pt will take all medication as prescribed daily while hospitalized. Outcome: Progressing Towards Goal 
aeb pt taking all medication as prescribed Problem: Falls - Risk of 
Goal: *Absence of Falls Document Tarsha Shepard Fall Risk and appropriate interventions in the flowsheet. Outcome: Progressing Towards Goal 
aeb pt free of falls this shift Pt cooperative and med compliant. Pt had a visitor during visitation and behavior was appropriate. Pt does not interact much with peers. Pt ate all meals. Pt spent the majority of the day in his room. Pt denies SI/HI/AVH at this time. Will continue to monitor and provide intervention as necessary.

## 2019-01-14 VITALS
TEMPERATURE: 98.2 F | RESPIRATION RATE: 18 BRPM | HEART RATE: 86 BPM | DIASTOLIC BLOOD PRESSURE: 91 MMHG | WEIGHT: 180 LBS | OXYGEN SATURATION: 99 % | SYSTOLIC BLOOD PRESSURE: 135 MMHG | BODY MASS INDEX: 29.99 KG/M2 | HEIGHT: 65 IN

## 2019-01-14 LAB
HCV AB SER IA-ACNC: 0.02 INDEX
HCV AB SERPL QL IA: NEGATIVE
HCV COMMENT,HCGAC: NORMAL

## 2019-01-14 PROCEDURE — 74011250637 HC RX REV CODE- 250/637: Performed by: PSYCHIATRY & NEUROLOGY

## 2019-01-14 RX ORDER — CITALOPRAM 20 MG/1
20 TABLET, FILM COATED ORAL DAILY
Qty: 15 TAB | Refills: 1 | Status: SHIPPED | OUTPATIENT
Start: 2019-01-14

## 2019-01-14 RX ADMIN — CITALOPRAM HYDROBROMIDE 20 MG: 20 TABLET ORAL at 09:29

## 2019-01-14 NOTE — DISCHARGE INSTRUCTIONS
BEHAVIORAL HEALTH NURSING DISCHARGE NOTE      The following personal items collected during your admission are returned to you:   Dental Appliance: Dental Appliances: None  Vision: Visual Aid: Glasses  Hearing Aid:    Jewelry: Jewelry: None  Clothing: Clothing: Footwear, Shirt, Socks, Jacket/Coat, Pants, Undergarments  Other Valuables: Other Valuables: Cell Phone, [employer] 1923, [address], Money (comment), Other (comment)(Cell phone case, [employer] D[employer]. Lic.(Wallet),SS Card (Wallet))  Valuables sent to safe: Personal Items Sent to Safe: $59.00,[facility] Card,AAA Card,[employer] x 2,VISA Amazon,Best Buy,,[employer],x 2,Master Card Ally      PATIENT INSTRUCTIONS:    Your health and safety is very important to us; we remind you to commit to safety and recovery. Should you have any thoughts of harming yourself or others please dial 911, utilize your support systems, the coping strategies you have learned as well as the [address] [place] [address] at [phone] or visit their website at https://suicidepreventionlifeline.org/    The following are some additional coping strategies that you can utilize if you start to feel anxious, angry, stressed or depressed    1. Exercise (running, walking, etc.). 2. Write (poetry, stories, journal). 3. Be with other people. 4. Watch a favorite TV show. 5. Practice Mindfulness  6. Watch a funny/favorite movie  7. Do some deep breathing  8. Take a hot shower or relaxing bath. 9. Play with a pet. 10. Help others  11. Read a good book. 12. Listen to music. 13. Try some aromatherapy (candle, lotion, room spray). 14. Meditate. 15. Paint or draw. 16. Rip paper into itty-bitty pieces. 17. Shoot hoops, kick a ball. 18. Hug a pillow or stuffed animal.  19. Dance. 20. Take up a new hobby. 21. [place]. 25. Make a list of blessings in your life. 23. Contact a hotline/ your therapist.  24. Talk to someone close to you. 25. Yoga. 32. [place] a friend or family member.   27. Make a list of goals for the week/month/year/5 years. The discharge information has been reviewed with the patient. The patient verbalized understanding.       Patient armband removed and shredded      ***IMPORTANT NUMBERS***        [address]        [phone]    [address] [place] [address]       [phone]    Suicide Prevention     [phone]

## 2019-01-14 NOTE — BSMART NOTE
SOCIAL WORK GROUP THERAPY PROGRESS NOTE Group Time:  11am 
 
Group Topic:  Coping Skills    C D Issues Group Participation:   
 
Pt moderately involved during group discussion but remained attentive. Members discussed handout on the role of \"neurotransmitters\" and how they regulate different aspects of one's moods, cognitions & related behavior. Looked at the \"Process of setting Goals\", the value of a \"daily\" /  \"weekly\" schedule as tool to build self esteem, sharpen decision making skills and help define one's reality.

## 2019-01-14 NOTE — BH NOTES
GROUP THERAPY PROGRESS NOTE Arun Jay is participating in [place]. Group time: 45 minutes Personal goal for participation: rules/regulations Goal orientation: community Group therapy participation: minimal 
 
Therapeutic interventions reviewed and discussed: He was educated on positive self-talk and learning how to deal with his stressors. Impression of participation: He was alert and cooperative during group he focused more on not letting stressors and negativity get him in a bad place.

## 2019-01-14 NOTE — BSMART NOTE
SW made contact with patient to address disposition. Patient reports he is prepared for discharge and is interested in outpatient therapy. Patient reports he is feeling great and is pleased with returning home. No further issues or complaints where expressed by patient. LINDSEY Sanders, LCSW-E

## 2019-01-14 NOTE — BH NOTES
Patient discharged to self. Patient denied suicidal or homicidal ideations. Denied audio or visual hallucinations. All personal belongings returned to patient. Discharge instructions, follow up appointments, and prescriptions explained to patient. Patient verbalized understanding. Patient escorted to exit by staff with personal belongings, prescriptions and discharge instructions in her possession.

## 2019-01-15 NOTE — DISCHARGE SUMMARY
[facility] Renny Garcia  MR#: [id number]  : 1990  ACCOUNT #: [de-identified]   ADMIT DATE: 2019  DISCHARGE DATE: 2019    SIGNIFICANT FINDINGS:  Attention is invited to the patient's history and physical exam.    REASON FOR HOSPITALIZATION:  The same as his prior psychiatric history and clearly stated. For the purpose of this discharge summary the reader is advised that the patient presented himself to [facility] after taking another dose with acetaminophen, a total of 5, 500 mg tablets, that occurred 15 minutes prior to his coming to the Emergency Department. The patient was treated with charcoal and was considered to be medically cleared with the possibility of a psychiatric admission given consideration. Evaluated by one of our crisis staff due to the patient's depression, the fact that he had lost control and taken the overdose and even though he first was the one who presented himself directly to the emergency room, it became obvious that the patient needed several days of inpatient treatment for further stabilization. The case was presented to the psychiatrist on call, who kindly admitted the patient to my service and sought reason for this admission. 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 The latter was difficult since he usually was out of town 5-7 days at  a time and after coming back home, he only had a day or two prior to the next trip that was scheduled for him.   So for that reason, the patient decided to stop working, however, it appears that economical concerns were not present since the patient has enough money in the bank, he says to be able to not to have to work for 1 year. Nonetheless, due to the difficulties that the patient was having, consideration was given also to prescribe treatment with antidepressants. The patient was provided with written information having him chosen to start prescriptions for citalopram up to 20 mg a day, which he is tolerating very well. After several days with his family (his uncle and aunt) visiting him once or twice a day if possible due to the inpatient adult program schedule, he described his depression as being much more stable and to be ready to be discharged to outpatient treatment, which we will proceed to do. I failed to state above that while in the process of being medically cleared, multiple labs were performed. All of them described also in the dictated admission note. For the purpose of this discharge summary, the tests included a CBC with differential that show mild leukopenia with a white blood cell count of 3.1; however, an elevated red blood cell count of 5.59 with hemoglobin of 16 also noted. Hematocrit normal at 46.5. Platelet count normal at 217. Blood chemistry showed normal electrolytes, low sugar of 94. Normal kidney function test.  An estimated GFR about 69 per minute for an -American. However, his liver function tests were normal with the exception of mild elevation of AST to 55 (values between 15 and 37). Acetaminophen levels 11 when he came in and several hours later (5 hours actually) levels were only 3. Salicylate levels below therapeutic range. Urinalysis was negative. Electrocardiogram showed the presence of a sinus tachy with a ventricular response of 112 per minute. , QTc 439.     Since admission, due to the elevated liver function tests, we repeated another series of liver function tests on 01/13 at which time results were again completely normal with his AST being only 47. Hep C antibody test was drawn, this showing negative results with the patient being informed about them. As to the reason for this mild elevation of his liver enzyme that is still a question. It is to be mentioned also that during the patient's hospital stay, we noted a mild elevation of his blood pressure. Upon discharge, his blood pressure this morning was 137/91, later on 135/91. The patient has been advised since his blood pressure has varied to consult with a primary care physician of his choice to be able to have his blood pressure followed regularly. If the patient requires treatment, this could be determined by his primary care physician as stated. CONDITION UPON DISCHARGE:  No suicidal, not homicidal, not psychotic, not organic and psychiatrically competent. FINAL DIAGNOSES:  AXIS I:  Major depressive disorder, single episode, without psychosis, severe. AXIS II:  Noncontributory. AXIS III:  Status post overdose with acetaminophen with mild elevation of a single liver enzyme, possibly overdose related, elevated blood pressure, rule out hypertension, ALLERGY TO PENICILLINS, WITH REACTION NOT SPECIFIED. DISPOSITION:  The patient is being discharged today to outpatient treatment. He will be followed at [facility] for psychiatric reasons including medication checkups and therapy, but in addition, he has been advised to consult with the primary care physician of his choice for followup appointments regarding his elevated blood pressure the same as to check on his liver profile. PRESCRIPTIONS UPON DISCHARGE:  Citalopram 20 mg tablets 15 with 1 refill, to take 1 every morning. No other prescriptions were given. PROGNOSIS:  Fairly good with outpatient treatment compliance.       MD DARIO Garsia/AJIT  D: 01/14/2019 11:50     T: 01/15/2019 03:59  JOB #: [id number]

## 2022-01-06 ENCOUNTER — HOSPITAL ENCOUNTER (OUTPATIENT)
Dept: LAB | Age: 32
Discharge: HOME | End: 2022-01-06

## 2022-01-06 LAB — SENTARA SPECIMEN COL,SENBCF: NORMAL

## 2022-01-06 PROCEDURE — 99001 SPECIMEN HANDLING PT-LAB: CPT

## 2022-03-18 PROBLEM — F32.2 MAJOR DEPRESSIVE DISORDER, SINGLE EPISODE, SEVERE WITHOUT PSYCHOTIC FEATURES (HCC): Status: ACTIVE | Noted: 2019-01-10

## 2022-03-19 PROBLEM — R45.89 SUICIDAL BEHAVIOR: Status: ACTIVE | Noted: 2019-01-10

## 2022-06-20 ENCOUNTER — HOSPITAL ENCOUNTER (OUTPATIENT)
Dept: LAB | Age: 32
Discharge: HOME | End: 2022-06-20

## 2022-06-20 LAB — SENTARA SPECIMEN COL,SENBCF: NORMAL

## 2022-06-20 PROCEDURE — 99001 SPECIMEN HANDLING PT-LAB: CPT

## 2022-09-14 ENCOUNTER — HOSPITAL ENCOUNTER (OUTPATIENT)
Dept: LAB | Age: 32
Discharge: HOME | End: 2022-09-14

## 2022-09-14 LAB — SENTARA SPECIMEN COL,SENBCF: NORMAL

## 2022-09-14 PROCEDURE — 99001 SPECIMEN HANDLING PT-LAB: CPT

## 2023-05-11 ENCOUNTER — HOSPITAL ENCOUNTER (OUTPATIENT)
Facility: HOSPITAL | Age: 33
Discharge: HOME | End: 2023-05-14

## 2023-05-11 LAB — SENTARA SPECIMEN COLLECTION: NORMAL

## 2023-05-11 PROCEDURE — 99001 SPECIMEN HANDLING PT-LAB: CPT

## 2024-01-25 ENCOUNTER — HOSPITAL ENCOUNTER (OUTPATIENT)
Facility: HOSPITAL | Age: 34
Discharge: HOME | End: 2024-01-28

## 2024-01-25 LAB — SENTARA SPECIMEN COLLECTION: NORMAL

## 2025-05-28 ENCOUNTER — HOSPITAL ENCOUNTER (OUTPATIENT)
Facility: HOSPITAL | Age: 35
Setting detail: SPECIMEN
Discharge: HOME | End: 2025-05-31

## 2025-05-28 LAB — SENTARA SPECIMEN COLLECTION: NORMAL

## 2025-05-28 PROCEDURE — 99001 SPECIMEN HANDLING PT-LAB: CPT
